# Patient Record
Sex: MALE | Race: OTHER | NOT HISPANIC OR LATINO | URBAN - METROPOLITAN AREA
[De-identification: names, ages, dates, MRNs, and addresses within clinical notes are randomized per-mention and may not be internally consistent; named-entity substitution may affect disease eponyms.]

---

## 2017-02-27 ENCOUNTER — EMERGENCY (EMERGENCY)
Facility: HOSPITAL | Age: 22
LOS: 1 days | Discharge: HOME | End: 2017-02-27
Admitting: EMERGENCY MEDICINE
Payer: COMMERCIAL

## 2017-02-27 VITALS
OXYGEN SATURATION: 99 % | TEMPERATURE: 98 F | HEART RATE: 54 BPM | SYSTOLIC BLOOD PRESSURE: 124 MMHG | DIASTOLIC BLOOD PRESSURE: 76 MMHG | RESPIRATION RATE: 16 BRPM

## 2017-02-27 DIAGNOSIS — F43.22 ADJUSTMENT DISORDER WITH ANXIETY: ICD-10-CM

## 2017-02-27 PROCEDURE — 99284 EMERGENCY DEPT VISIT MOD MDM: CPT

## 2017-02-27 PROCEDURE — 99285 EMERGENCY DEPT VISIT HI MDM: CPT | Mod: GC

## 2017-02-27 NOTE — ED ADULT NURSE NOTE - OBJECTIVE STATEMENT
pt received a&ox3, pt states he was at the school counselor and doesn't know how he got there, states this is his normal approx 1 episode of lapse of judgment once every 2 weeks, pt denies any medical or psychiatric hx, pt states he has "bouts of depression" and what he describes as "bouts of anxiety", pt is calm and cooperative, appears stable and in NAD, will continue to monitor . pt received a&ox3, pt states he was at the school counselor and doesn't know how he got there, states this is his normal approx 1 episode of lapse of judgment once every 2 weeks, pt denies any medical or psychiatric hx, pt states he has "bouts of depression" and what he describes as "bouts of anxiety", pt admits to smoking marijuana but denies other drug use, denies heavy drinking, pt is calm and cooperative, appears stable and in NAD, will continue to monitor .

## 2017-02-27 NOTE — ED ADULT TRIAGE NOTE - CHIEF COMPLAINT QUOTE
Patient brought in by EMS from  post c/o "feeling lost." Denies SI/HI, hallucinations or drug/alcohol use today.

## 2017-02-27 NOTE — ED BEHAVIORAL HEALTH ASSESSMENT NOTE - DESCRIPTION
see hpi denied calm cooperative pleasant, well related, appropriate, friendly with staff and patients, no PRN or restraints,

## 2017-02-27 NOTE — ED BEHAVIORAL HEALTH ASSESSMENT NOTE - SUICIDE PROTECTIVE FACTORS
Ability to cope with stress/Responsibility to family and others/Future oriented/High spirituality/Engaged in work or school/Positive therapeutic relationships/High frustration tolerance/Identifies reasons for living/Fear of death or dying due to pain/suffering

## 2017-02-27 NOTE — ED BEHAVIORAL HEALTH ASSESSMENT NOTE - RISK ASSESSMENT
Protective factors include no suicide attempts, no violence history, no access to guns, no global insomnia, no significant substance abuse, willingness to seek help, no active suicidal ideation, no homicidal ideation, hopefulness for future. Risk factors include rosenberg orientation, male gender, anxiety symptoms at times. While patient has risk factors, his many protective factors mitigate risks. Patient does not require inpatient locked setting. He will benefit most from outpatient psychotherapy and patient accepted referrals.

## 2017-02-27 NOTE — ED PROVIDER NOTE - OBJECTIVE STATEMENT
This is a 21 year old Male PMHx This is a 21 year old Male No PMHx came in today for psych eval r/t increased anxiety.  Patient reports he had a really bad morning with increased anxiety, constant stress and over thinking everything become overwhelmed and had an anxiety attack that caused him to miss class patient attends [employer] program which is very challenging. Reports here from [facility] and missing family, father recently . Denies chest pain, SOB, N/V/D and fevers, Denies palpitations or diaphoresis. Denies Numbness, Tingling, Blurry Vision and HA.  Denies suicidal/homicidal thoughts. Denies visual/auditory hallucinations. Denies ETOH/Illicit drug use. Denies recent falls, trauma and injuries. Denies pain or any other medical complaints.

## 2017-02-27 NOTE — ED BEHAVIORAL HEALTH ASSESSMENT NOTE - REFERRAL / APPOINTMENT DETAILS
Writer gave patient list of referrals from [facility] and Dr. Tony Lange

## 2017-02-27 NOTE — ED BEHAVIORAL HEALTH ASSESSMENT NOTE - HPI (INCLUDE ILLNESS QUALITY, SEVERITY, DURATION, TIMING, CONTEXT, MODIFYING FACTORS, ASSOCIATED SIGNS AND SYMPTOMS)
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     Arely Falcon states patient frequently comes to the counseling center to speak to staff and today presented with more anxiety than usual, seemed to be disassociating as he stated he couldn't remember things that happened earlier that day and she was concerned. She denied patient making any suicidal or homicidal statements. She states she believes patient would benefit from seeing someone on an ongoing basis.  Writer called back to notify Ms. Falcon that patient will be discharged with plan to f/u with outpt treatment. Spoke with Magalie MAHER who states she will relay the message.    In ED, patient is calm, cooperative and pleasant, friendly with writer and easily engaged in psychiatric interview. He states that today he woke up late for class, felt anxious and after smoking a cigarette felt worse and decided to go to talk to someone at the counseling center. He states he told Ms. Falcon that he didn't remember getting to the office because "at the time I didn't" as he states "I literally just woke up" but states he actually does remember everything about today. He states he was just trying to express how anxious he was, but he thinks Ms. Falcon took what he was saying the wrong way. He states he has had anxiety for several years, but denies it being pervasive, denies panic attacks, does not describe a generalized anxiety disorder. He reports sleeping every night (states he sleep 3 hours a night, but when describing his sleep patterns, actually sleeps at least 6 or more hours a night), reports eating well with no weight changes, denies concentration problems, states he is attending class most of the time and doing well in school. He denies any psychotic symptoms and none are observed/reported. He is bright, linear, logical, organized and well-related, not internally preoccupied at any time. He denies any depressive symptoms and states mood is generally euthymic. He denies any manic sx and none are described in past or present. He denies any SI, intent or plan at any time, denies any HI, violent thoughts. He states he would never harm himself as he loves life, wants to become a [employer] actor and plans to go to graduate school in the future. He states he is currently dating a man from [place] who loves to go to plays with him in the city. Patient states he would like to see a therapist as he likes talking with someone about his issues (mainly with his mother and with his ex-boyfriend). He states he would not take any psychotropic medications (and based on evaluation, he does not require any psychopharm interventions).

## 2017-02-27 NOTE — ED BEHAVIORAL HEALTH ASSESSMENT NOTE - DETAILS
writer spoke to Arely Falcon initially, she was busy when writer called back, gave disposition to Magalie MAHER and told her to have MsTawnya Falcon call back if she has questions maternal aunt has mental health issues, unspecified

## 2017-02-27 NOTE — ED PROVIDER NOTE - MEDICAL DECISION MAKING DETAILS
This is a 21 year old Male No PMHx came in today for psych eval r/t increased anxiety. Medical evaluation performed. There is no clinical evidence of intoxication or any acute medical problem requiring immediate intervention. Final disposition will be determined by psychiatrist.

## 2017-02-27 NOTE — ED BEHAVIORAL HEALTH ASSESSMENT NOTE - CASE SUMMARY
21M with no psychiatric history referred by [facility] for anxiety and possible dissociative episode. He reported to the school counselor that he did not recall how he got there and was anxious. He otherwise denied symptoms of depression stacy or psychosis. No evidence of intoxication. No suicidal ideation or homicidal ideation. Patient describes ongoing life stressors and is interested in psychotherapy to address these issues. no indication for psychiatric admission. DC. Discussed with school.

## 2017-02-27 NOTE — ED BEHAVIORAL HEALTH ASSESSMENT NOTE - SUMMARY
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   In ED, patient is calm, cooperative, pleasant, well related, friendly & appropriate. He is not psychotic, manic, depressed and not suicidal or homicidal. Patient is future-oriented, hopeful and treatment-oriented-interested in receiving therapy which would be beneficial to patient.  He does not require inpatient admission and would not benefit from a locked unit at this time. He would benefit most from outpatient psychotherapy and is agreeing to accept referrals which writer provided him with. He agrees to return if he has SI, worsening symptoms.